# Patient Record
(demographics unavailable — no encounter records)

---

## 2018-01-26 NOTE — EMERGENCY ROOM REPORT
History of Present Illness


General


Chief Complaint:  Pregnancy Complications


Source:  Patient, Medical Record





Present Illness


HPI


22-year-old female patient presents to ER complaining of right lower quadrant 

pain since this morning.


Patient states the pain is intermittent; reports the pain is dull and "squeezing

" in nature.


Patient states she has not taken anything for the pain; reports she is able to 

"breathe through the pain" until it subsides.


Patient states she is 5 weeks pregnant.


Patient states she is currently taking prenatal vitamins.


Patient reports a history of 2 previous pregnancies; states both resulted in 

spontaneous abortions. Patient reports both pregnancies were considered high 

risk secondary to high blood pressure.


Patient reports history of allergy to Acetaminophen; states she becomes 

nauseous and vomits when she takes the pills.


Patient denies fever, dysuria, hematuria, vaginal bleeding.


Allergies:  


Coded Allergies:  


     ACETAMINOPHEN (Verified  Allergy, Unknown, 10/26/15)


     AMOXICILLIN (Verified  Allergy, Unknown, 18)


     IBUPROFEN (Verified  Allergy, Unknown, 10/26/15)


     PAMABROM (Verified  Allergy, Unknown, 10/26/15)





Patient History


Last Menstrual Period:  17


Pregnant Now:  Yes - 5 weeks


:  3


Para:  0


Reviewed Nursing Documentation:  PMH: Agreed, PSxH: Agreed





Nursing Documentation-PMH


Past Medical History:  No History, Except For


Hx Asthma:  Yes - positive TB test


Hx Neurological Problems:  Yes - LUPUS





Review of Systems


All Other Systems:  negative except mentioned in HPI





Physical Exam





Vital Signs








  Date Time  Temp Pulse Resp B/P (MAP) Pulse Ox O2 Delivery O2 Flow Rate FiO2


 


18 16:53 98.2 88 18 127/72 100 Room Air  








Sp02 EP Interpretation:  reviewed, normal


General Appearance:  no apparent distress, alert, GCS 15, non-toxic


Head:  normocephalic, atraumatic


Eyes:  bilateral eye normal inspection, bilateral eye PERRL


ENT:  hearing grossly normal, normal pharynx, normal voice, TMs + canals normal

, uvula midline, moist mucus membranes


Neck:  full range of motion


Respiratory:  chest non-tender, lungs clear, normal breath sounds, no 

respiratory distress, speaking full sentences


Cardiovascular #1:  regular rate, rhythm, no edema


Gastrointestinal:  normal bowel sounds, soft, no mass, non-distended, no 

guarding, no rebound, tenderness - RLQ, other - pain in RLQ with jumping on 

right foot


Genitourinary:  no CVA tenderness


Musculoskeletal:  back normal, gait/station normal, normal range of motion, non-

tender, no calf tenderness


Neurologic:  alert, oriented x3, responsive, motor strength/tone normal, 

sensory intact, speech normal


Psychiatric:  mood/affect normal


Skin:  normal color, no rash, warm/dry, well hydrated





Medical Decision Making


PA Attestation


Dr. Martins  is my supervising Physician whom patient management has been 

discussed with.


Diagnostic Impression:  


 Primary Impression:  


 Abdominal pain during pregnancy


 Additional Impression:  


 Cyst of left kidney


ER Course


Pt. presents to the ED c/o abdominal pain.





Ddx considered but are not limited to UTI, appendicitis, ovarian torsion, 

pregnancy related abdominal pain.


US performed in ED by Dr. Martins to confirm presence of IUP. 


Discussed workup with Dr. Martins, began workup to rule out appendicitis and 

ovarian torsion.





Vital signs: are WNL, pt. is afebrile.





ORDERS: 


UA with reflux, urine pregnancy


CBC, CMP, PT, PTT, lipase


Type and Cross


Abdominal US





US technician reports no acute findings of appendicitis or ovarian torsion. 

Reports finding of cyst in left kidney.





ER COURSE:


Discussed results of labs and imaging with patient.


CBC shows no elevation in WBC, infection unlikely.


UA negative for nitrites and WBCs, UTI unlikely. 


Abdominal US negative for acute appendicitis and ovarian torsion. Informed 

patient of left kidney cyst; informed patient that cyst is unlikely to be cause 

of pain; instructed patient to follow up with PCP regarding monitoring and 

treatment of cyst.








Patient reporting pain has subsided since acute onset. Patient resting 

comfortably in the room, in no acute distress, non-toxic appearing.


Patient requested medication for nausea. Explained possible effects of nausea 

medications on pregnancy. Patient declined use of medication for nausea 

following discussion of risks of use.


Patient was not discharge home with medications for pain secondary to allergy 

symptoms and pregnancy. Patient declined use of medication for pain.


Patient states she does not want to take any medication that may cause 

potential complications with her pregnancy.





Informed patient that abdominal pain may be related to pregnancy. Patient 

instructed to follow up with OBGYN in 3-5 days for further treatment and 

evaluation of pain symptoms. Patient reports understanding and agreement with 

treatment plan.





At this time pt. is stable for d/c to home with strict instructions to return 

to ER for new and/or worsening of symptoms.


Will provide printed patient care instructions, and any necessary 

prescriptions. 


Care plan and follow up instructions have been discussed with the patient prior 

to discharge





Labs








Test


  18


17:15 18


18:20


 


Urine Color Pale yellow  


 


Urine Appearance Clear  


 


Urine pH 7 (4.5-8.0)  


 


Urine Specific Gravity


  1.010


(1.005-1.035) 


 


 


Urine Protein


  Negative


(NEGATIVE) 


 


 


Urine Glucose (UA)


  Negative


(NEGATIVE) 


 


 


Urine Ketones


  Negative


(NEGATIVE) 


 


 


Urine Occult Blood


  Negative


(NEGATIVE) 


 


 


Urine Nitrite


  Negative


(NEGATIVE) 


 


 


Urine Bilirubin


  Negative


(NEGATIVE) 


 


 


Urine Urobilinogen


  Normal MG/DL


(0.0-1.0) 


 


 


Urine Leukocyte Esterase 1+ (NEGATIVE)  


 


Urine RBC


  0-2 /HPF (0 -


2) 


 


 


Urine WBC


  0-2 /HPF (0 -


2) 


 


 


Urine Squamous Epithelial


Cells Moderate /LPF


(NONE/OCC) 


 


 


Urine Bacteria


  Occasional


/HPF (NONE) 


 


 


Urine HCG, Qualitative Positive  


 


White Blood Count


  


  10.6 K/UL


(4.8-10.8)


 


Red Blood Count


  


  4.10 M/UL


(4.20-5.40)


 


Hemoglobin


  


  12.2 G/DL


(12.0-16.0)


 


Hematocrit


  


  36.5 %


(37.0-47.0)


 


Mean Corpuscular Volume  89 FL (80-99) 


 


Mean Corpuscular Hemoglobin


  


  29.7 PG


(27.0-31.0)


 


Mean Corpuscular Hemoglobin


Concent 


  33.3 G/DL


(32.0-36.0)


 


Red Cell Distribution Width


  


  11.9 %


(11.6-14.8)


 


Platelet Count


  


  320 K/UL


(150-450)


 


Mean Platelet Volume


  


  6.7 FL


(6.5-10.1)


 


Neutrophils (%) (Auto)


  


  59.7 %


(45.0-75.0)


 


Lymphocytes (%) (Auto)


  


  30.1 %


(20.0-45.0)


 


Monocytes (%) (Auto)


  


  6.6 %


(1.0-10.0)


 


Eosinophils (%) (Auto)


  


  2.7 %


(0.0-3.0)


 


Basophils (%) (Auto)


  


  1.0 %


(0.0-2.0)


 


Prothrombin Time


  


  10.1 SEC


(9.30-11.50)


 


Prothromb Time International


Ratio 


  1.0 (0.9-1.1) 


 


 


Activated Partial


Thromboplast Time 


  29 SEC (23-33) 


 


 


Sodium Level


  


  138 MMOL/L


(136-145)


 


Potassium Level


  


  3.9 MMOL/L


(3.5-5.1)


 


Chloride Level


  


  105 MMOL/L


()


 


Carbon Dioxide Level


  


  26 MMOL/L


(21-32)


 


Anion Gap


  


  7 mmol/L


(5-15)


 


Blood Urea Nitrogen


  


  10 mg/dL


(7-18)


 


Creatinine


  


  0.8 MG/DL


(0.55-1.30)


 


Estimat Glomerular Filtration


Rate 


  > 60 mL/min


(>60)


 


Glucose Level


  


  84 MG/DL


()


 


Calcium Level


  


  8.6 MG/DL


(8.5-10.1)


 


Total Bilirubin


  


  0.1 MG/DL


(0.2-1.0)


 


Aspartate Amino Transf


(AST/SGOT) 


  11 U/L (15-37) 


 


 


Alanine Aminotransferase


(ALT/SGPT) 


  15 U/L (12-78) 


 


 


Alkaline Phosphatase


  


  70 U/L


()


 


Total Protein


  


  7.3 G/DL


(6.4-8.2)


 


Albumin


  


  3.4 G/DL


(3.4-5.0)


 


Globulin  3.9 g/dL 


 


Albumin/Globulin Ratio  0.9 (1.0-2.7) 


 


Lipase


  


  158 U/L


()


 


Human Chorionic Gonadotropin,


Quant 


  66764 mIU/mL


(1-6)








CT/MRI/US Diagnostic Results


CT/MRI/US Diagnostic Results :  


   Imaging Test Ordered:  US abdomen


   Impression


No evidence acute intra-abdominal pathology.


 


Left simple renal cyst.





Last Vital Signs








  Date Time  Temp Pulse Resp B/P (MAP) Pulse Ox O2 Delivery O2 Flow Rate FiO2


 


18 16:53 98.2 88 18 127/72 100 Room Air  








Disposition:  HOME, SELF-CARE


Condition:  Stable


Patient Instructions:  Abdominal Pain During Pregnancy, Easy-to-Read





Additional Instructions:  


Followup with OBGYN in 3 -5 days.


Take medications as directed. 


Patient questions asked and answered.


ER precautions given, patient instructed to return to ER immediately for any 

new or worsening of symptoms.











Rodolfo Honeycutt 2018 17:15

## 2018-01-27 NOTE — DIAGNOSTIC IMAGING REPORT
Indication: Pain

 

Technique: US ABD Complete

 

Comparison: None

 

Findings: 

Imaged portions of the pancreatic head are unremarkable. The body and tail are not

seen. Liver is normal in size with the right lobe measuring 15.9 cm in length.

Hepatic contour is smooth. No focal liver lesion is appreciated sonographically.

Portal vein is patent with normal directional flow. Imaged hepatic veins are patent.

Common bile duct measures 3.4 mm in diameter. Gallbladder within normal limits. No

gallbladder wall thickening or pericholecystic fluid.

 

Kidneys are symmetric in size. They demonstrate normal parenchymal thickness and

echogenicity. A circumscribed anechoic rounded structure in the midpole the left

kidney measuring up to 1.4 cm compatible with a simple renal cyst. No hydronephrosis

or sonographically appreciable renal stones bilaterally.

 

Spleen unremarkable in appearance. It is normal in size.

 

Visualized portions of the aorta and IVC unremarkable in appearance. There is no

ascites.

 

IMPRESSION: 

No sonographic evidence of acute intra-abdominal pathology.

 

Left simple renal cyst.

 

This corresponds with the preliminary report issued to the emergency department by

the ultrasound technologist.

## 2018-02-22 NOTE — EMERGENCY ROOM REPORT
History of Present Illness


General


Chief Complaint:  Pregnancy Complications


Source:  Patient


 (Rodolfo Honeycutt)





Present Illness


HPI


24 yo  patient presents to ER sent by OBGYN for possible threatened 

.


Patient reports vaginal spotting x3days; denies passage of tissue.


Patient denies vaginal discharge.


Denies dysuria, frequency, urgency.


Patient complaining of abdominal pain and flank pain. 


Patient also complains of nausea and vomiting during this time. 


Denies hx of trauma.


States unable to take nausea and NSAID medications due to risk of fetal 

complications and allergies to medications.


Patient reports following pelvic exam at OBGYN was sent to ER to rule out 

threatened .


Patient reports history of 2 spontaneous abortions and one high risk pregnancy 

that went to full term.


Patient denies chest pain, HA, SOB, rash.


 (Rodolfo Honeycutt)


Allergies:  


Coded Allergies:  


     ACETAMINOPHEN (Verified  Allergy, Unknown, 10/26/15)


     AMOXICILLIN (Verified  Allergy, Unknown, 18)


     IBUPROFEN (Verified  Allergy, Unknown, 10/26/15)


     PAMABROM (Verified  Allergy, Unknown, 10/26/15)





Patient History


Past Medical History:  see triage record


Last Menstrual Period:  2017


Pregnant Now:  Yes


:  4


Para:  1


 (Rodolfo Honeycutt)





Nursing Documentation-[facility]


Past Medical History:  No History, Except For


Hx Asthma:  Yes - positive TB test


Hx Neurological Problems:  Yes - LUPUS


 (Rodolfo Honeycutt)





Review of Systems


All Other Systems:  negative except mentioned in HPI


 (Rodolfo Honeycutt)





Physical Exam





Vital Signs








  Date Time  Temp Pulse Resp B/P (MAP) Pulse Ox O2 Delivery O2 Flow Rate FiO2


 


18 13:37 98.3 106 18 109/69 97 Room Air  





 98.2       








Sp02 EP Interpretation:  reviewed, normal


General Appearance:  well appearing, alert, GCS 15, non-toxic, mild distress


Head:  normocephalic, atraumatic


Eyes:  bilateral eye normal inspection, bilateral eye PERRL


ENT:  hearing grossly normal, normal pharynx, normal voice, TMs + canals normal

, uvula midline, moist mucus membranes


Neck:  full range of motion


Respiratory:  normal inspection, lungs clear, normal breath sounds, no 

respiratory distress, no accessory muscle use, no wheezing, speaking full 

sentences


Cardiovascular #1:  regular rate, rhythm, no edema


Gastrointestinal:  normal bowel sounds, non tender, soft, no mass, non-distended

, no guarding, no rebound, other - negative Rovsing, negative TTP at McBurney's 

point


Genitourinary:  no CVA tenderness, other, deferred


Musculoskeletal:  back normal, digits/nails normal, gait/station normal, normal 

range of motion, no calf tenderness


Neurologic:  alert, oriented x3, responsive, motor strength/tone normal, 

sensory intact, normal gait


Psychiatric:  mood/affect normal


Skin:  no rash


Lymphatic:  no adenopathy


 (Rodolfo Honeycutt)





Medical Decision Making


PA Attestation


Dr. Rosado is my supervising Physician whom patient management has been 

discussed with.


 (Rodolfo Honeycutt)


Diagnostic Impression:  


 Primary Impression:  


 Abdominal pain during pregnancy


 Qualified Codes:  O26.891 - Other specified pregnancy related conditions, 

first trimester; R10.9 - Unspecified abdominal pain


 Additional Impressions:  


 Nausea/vomiting in pregnancy


 Threatened miscarriage in early pregnancy


ER Course


Pt. presents to the ED c/o possible threatened  sent to ER by OBGYN.





Ddx considered but are not limited to threatened , ectopic pregnancy, 

missed , gestational trophoblastic disease.





Vital signs: WNL pt. is afebrile; pulse slightly elevated, likely normal due to 

patient being mildly anxious over possible pregnancy complications; denies 

chest pain, SOB.





Ordered labs, UA, HCQ quant, urine pregnancy, pelvic US, type and screen, and 

IV fluids.


Patient declining medications at this time; reports allergies to Tylenol and 

Motrin and does not want nausea medications due to possible concerns of fetal 

complications.





ER COURSE:


Patient deferred pelvic exam; states already had pelvic exam performed at OBGYN 

today and does not want another performed.


CBC unremarkable, no elevation in WBC.


HCG quant elevated; shows increase in level from previous visit.


UA negative for nitrites, does not require treatment for UTI


Urine HCG positive.


Pelvic US negative; shows IUP, fetal pole, yolk and gestational sac, FHR 

positive and no free fluid or adnexal masses.


Results discussed with patient. Informed patient of type and screen results.





Patient resting comfortably in bed, reports feeling mildly better after 

receiving IV fluids. Patient still reports pain complaints at this time.


Informed patient to followup with OBGYN and primary care provider for further 

followup and treatment. Patient reports appointments already scheduled.


Informed patient that she should discuss bed rest with OBGYN due to recurrence 

of symptoms. Informed patient to rest at home until followup visit.


Patient and mother requesting copy of ultrasound report from today and previous 

visits. Patient informed to report to medical records. Provided copy of today's 

US results in discharge paperwork





DISCHARGE: 


No rx provided at this time. Followup with OBGYN for further treatment for 

symptom relief.





At this time pt. is stable for d/c to home. Patient vitals WNL prior to 

discharge.


Will provide printed patient care instructions, and any necessary prescriptions.


Patient instructed to follow with primary care provider for further treatment 

and referral as needed.


Care plan and follow up instructions have been discussed with the patient prior 

to discharge.


Patient reports understanding and agreement to treatment plan.


Patient questions asked and answered.


ER precautions given, patient instructed to return to ER immediately for any 

new or worsening of symptoms including but not limited to heavy bleeding, fever

, pain.





Labs








Test


  18


14:00 18


14:10


 


White Blood Count


  7.5 K/UL


(4.8-10.8) 


 


 


Red Blood Count


  4.59 M/UL


(4.20-5.40) 


 


 


Hemoglobin


  13.7 G/DL


(12.0-16.0) 


 


 


Hematocrit


  40.5 %


(37.0-47.0) 


 


 


Mean Corpuscular Volume 88 FL (80-99)  


 


Mean Corpuscular Hemoglobin


  29.9 PG


(27.0-31.0) 


 


 


Mean Corpuscular Hemoglobin


Concent 33.9 G/DL


(32.0-36.0) 


 


 


Red Cell Distribution Width


  11.9 %


(11.6-14.8) 


 


 


Platelet Count


  298 K/UL


(150-450) 


 


 


Mean Platelet Volume


  6.7 FL


(6.5-10.1) 


 


 


Neutrophils (%) (Auto)


  79.1 %


(45.0-75.0) 


 


 


Lymphocytes (%) (Auto)


  12.7 %


(20.0-45.0) 


 


 


Monocytes (%) (Auto)


  6.2 %


(1.0-10.0) 


 


 


Eosinophils (%) (Auto)


  1.4 %


(0.0-3.0) 


 


 


Basophils (%) (Auto)


  0.7 %


(0.0-2.0) 


 


 


Sodium Level


  135 MMOL/L


(136-145) 


 


 


Potassium Level


  4.0 MMOL/L


(3.5-5.1) 


 


 


Chloride Level


  101 MMOL/L


() 


 


 


Carbon Dioxide Level


  27 MMOL/L


(21-32) 


 


 


Anion Gap


  7 mmol/L


(5-15) 


 


 


Blood Urea Nitrogen 9 mg/dL (7-18)  


 


Creatinine


  0.6 MG/DL


(0.55-1.30) 


 


 


Estimat Glomerular Filtration


Rate > 60 mL/min


(>60) 


 


 


Glucose Level


  95 MG/DL


() 


 


 


Calcium Level


  9.1 MG/DL


(8.5-10.1) 


 


 


Total Bilirubin


  0.1 MG/DL


(0.2-1.0) 


 


 


Aspartate Amino Transf


(AST/SGOT) 19 U/L (15-37) 


  


 


 


Alanine Aminotransferase


(ALT/SGPT) 21 U/L (12-78) 


  


 


 


Alkaline Phosphatase


  71 U/L


() 


 


 


Total Protein


  7.4 G/DL


(6.4-8.2) 


 


 


Albumin


  3.3 G/DL


(3.4-5.0) 


 


 


Globulin 4.1 g/dL  


 


Albumin/Globulin Ratio 0.8 (1.0-2.7)  


 


Lipase


  140 U/L


() 


 


 


Human Chorionic Gonadotropin,


Quant [id number] mIU/mL


(1-6) 


 


 


Urine Color  Yellow 


 


Urine Appearance  Clear 


 


Urine pH  7 (4.5-8.0) 


 


Urine Specific Gravity


  


  1.015


(1.005-1.035)


 


Urine Protein


  


  Negative


(NEGATIVE)


 


Urine Glucose (UA)


  


  Negative


(NEGATIVE)


 


Urine Ketones


  


  Negative


(NEGATIVE)


 


Urine Occult Blood  2+ (NEGATIVE) 


 


Urine Nitrite


  


  Negative


(NEGATIVE)


 


Urine Bilirubin


  


  Negative


(NEGATIVE)


 


Urine Urobilinogen


  


  Normal MG/DL


(0.0-1.0)


 


Urine Leukocyte Esterase  1+ (NEGATIVE) 


 


Urine RBC


  


  2-4 /HPF (0 -


2)


 


Urine WBC


  


  2-4 /HPF (0 -


2)


 


Urine Squamous Epithelial


Cells 


  Moderate /LPF


(NONE/OCC)


 


Urine Bacteria


  


  Few /HPF


(NONE)


 


Urine HCG, Qualitative  Positive 








 (Rodolfo Honeycutt P.A.)





CT/MRI/US Diagnostic Results


CT/MRI/US Diagnostic Results :  


   Imaging Test Ordered:  Pelvis US


   Impression


Appear to be GS, yolk sac, and fetal pole measured 11 wks and 2 days.


FHR positive at 168bpm.


No adnexal mass.


No free fluid.


 (Rodolfo Honeycutt P.A.)





Last Vital Signs








  Date Time  Temp Pulse Resp B/P (MAP) Pulse Ox O2 Delivery O2 Flow Rate FiO2


 


18 13:37 98.3 106 18 109/69 97 Room Air  





 98.2       








 (Rodolfo Honeycutt)





Last Vital Signs








  Date Time  Temp Pulse Resp B/P (MAP) Pulse Ox O2 Delivery O2 Flow Rate FiO2


 


18 16:23 98.3 68 18 109/69 97 Room Air  





 98.2       








Status:  improved


 (Joey Rosado M.D.)


Disposition:  HOME, SELF-CARE


Condition:  Stable


Patient Instructions:  Abdominal Pain During Pregnancy, Easy-to-Read





Additional Instructions:  


Followup with OBGYN in 2-3 days.


Patient questions asked and answered.


ER precautions given, patient instructed to return to ER immediately for any 

new or worsening of symptoms.





Ultrasound results below.


Appear to be GS, yolk sac, and fetal pole measured 11 wks and 2 days.


FHR positive at 168bpm.


No adnexal mass.


No free fluid.











Rodolfo Honeycutt 2018 14:33


Joey Rosado M.D. 2018 23:18

## 2018-05-04 NOTE — DIAGNOSTIC IMAGING REPORT
Indications: Pregnant patient. Abdominal CABG x1 week. Nausea, vomiting. 

 

Technique: Transabdominal real-time grayscale and duplex Doppler imaging of

intrauterine pregnancy was performed.

 

Comparison: 2/22/2018.

 

Findings:

There is a single live intrauterine pregnancy identified. Presentation is breech at

this time. A three-vessel cord is noted. The placenta is anterior. Spontaneous fetal

motion and cardiac motion noted with a fetal heart rate of approximately 140 bpm.

Amniotic fluid index is within normal limits, measuring approximately 15 mm.

Biparietal diameter of approximately 4.8 cm. Head circumference of approximately 18

cm. Abdominal circumference approximately 15 cm. Femur length approximately 3.3 cm.

No gross fetal abnormality is identified however please note full anatomy scan with

OB/GYN recommended for better evaluation. The cervix is long and closed. Ovaries are

not identified.

 

IMPRESSION: 

[place] live intrauterine pregnancy with composite gestational age by ultrasound of

approximately 20 weeks 3 days. No definite focal abnormality identified, however

anatomy scan is limited.

 

Note: A negative ultrasound evaluation does not insure fetal well-being or positive

outcome for the pregnancy. Fetal monitoring including a nonstress test may be needed

and clinical evaluation by OB/GYN is highly recommended.

## 2018-05-04 NOTE — EMERGENCY ROOM REPORT
History of Present Illness


General


Chief Complaint:  Abdominal Pain


Source:  Patient





Present Illness


HPI


24 yo female pregnant patient presents to ER complaining of abdominal pain and 

vaginal cramping for the past few days.


Patient reports that she is 20 weeks pregnant. reports her "friend" performed 

an ultrasound yesterday and saw that there was less fluid than there should be, 

told, "friend" to go to OB/GYN. Reports she went OB/GYN today.


Reports was seen in OB/GYN office today and sent to ER because could not 

perform ultrasound in office after that time.


States unable to take nausea and NSAID medications due to risk of fetal 

complications and allergies to medications.


Patient reports history of 2 spontaneous abortions and one high risk pregnancy 

that went to full term; reports high risk pregnancy due to leg swelling and 

blood pressure. Reports hx of intermittent leg swelling during this time. 

Denies pain with ambulation or calf pain acutely.


Denies hx of blood pressure problems or past medical hx.


Denies hx of fainting.


Denies fever, chest pain, calf pain, HA, vision changes.


patient also complains of intermittent epistaxis during this time, reports 

bleeding from  nose.  Terrell active bleeding at this time.


Allergies:  


Coded Allergies:  


     AMOXICILLIN (Verified  Allergy, Unknown, 18)





Patient History


Past Medical History:  see triage record


Reviewed Nursing Documentation:  PMH: Agreed; PSxH: Agreed





Nursing Documentation-PMH


Past Medical History:  No History, Except For


Hx Asthma:  Yes - positive TB test


Hx Neurological Problems:  Yes - LUPUS





Review of Systems


All Other Systems:  negative except mentioned in HPI





Physical Exam





Vital Signs








  Date Time  Temp Pulse Resp B/P (MAP) Pulse Ox O2 Delivery O2 Flow Rate FiO2


 


18 12:05 99.2 110 20 119/63 97 Room Air  





 99.1       








Sp02 EP Interpretation:  reviewed, normal


General Appearance:  well appearing, no apparent distress, alert, GCS 15, non-

toxic


Head:  normocephalic, atraumatic


Eyes:  bilateral eye normal inspection, bilateral eye PERRL


ENT:  hearing grossly normal, normal pharynx, no angioedema, normal voice, TMs 

+ canals normal, uvula midline, moist mucus membranes, other - left nares: 

dried blood, no active bleeding


Neck:  full range of motion


Respiratory:  lungs clear, normal breath sounds, no rhonchi, no respiratory 

distress, no accessory muscle use, no wheezing, speaking full sentences


Cardiovascular #1:  regular rate, rhythm, no edema


Gastrointestinal:  non tender, soft, no mass, non-distended, no guarding, no 

rebound


Genitourinary:  no CVA tenderness, deferred


Musculoskeletal:  back normal, digits/nails normal, gait/station normal, normal 

range of motion, non-tender, no calf tenderness, Eliseo's Sign negative


Neurologic:  alert, oriented x3, responsive, motor strength/tone normal, 

sensory intact


Psychiatric:  mood/affect normal





Medical Decision Making


PA Attestation


Dr. Hills  is my supervising Physician whom patient management has been 

discussed with.


Diagnostic Impression:  


 Primary Impression:  


 Abdominal pain during pregnancy


ER Course


Pt presents to ED c/o vaginal bleeding/spotting and epistaxis.





DDX considered but are not limited to threatened , incomplete , 

complete , UTI, placenta previa, placenta abruption.





VITAL SIGNS are WNL, patient is afebrile.





Ordered CBC, CMP, UA, UCG, bHCG, IV NS and pelvic US. Tylenol for pain control.





ER COURSE:


Patient declined medication.





CBC and CMP unremarkable, Hgb 11.1, blood glucose 116


UA results moderate epithelial cells, leukocyte esterase 2+, negative nitrites, 

no complaints of dysuria, will not treat for UTI at this time.


Urine pregnancy positive


BetaHCG 8514





Blood type previously done.


Blood type O positive, antibody screen negative.





Pelvic US shows 20 week 3 day gestation, IUP,  , active fetal movements.





Patient reports feeling better following IV fluids.





Discuss patient with Dr. Hills. Due to concerns of premature labor in patient 

over 20 weeks.


Contacted L&D at [facility], discuss patient with OBGYN Dr. Polanco. 


Dr. Polanco states will accept transfer of patient.


Patient transferred by ambulance to [facility] L[facility].








- Please note that this Emergency Department Report was dictated using [employer] technology software, occasionally this can lead to 

erroneous entry secondary to interpretation by the dictation equipment.





Labs








Test


  18


12:30 18


12:45


 


Urine Color Pale yellow  


 


Urine Appearance Clear  


 


Urine pH 6.5 (4.5-8.0)  


 


Urine Specific Gravity


  1.010


(1.005-1.035) 


 


 


Urine Protein


  Negative


(NEGATIVE) 


 


 


Urine Glucose (UA)


  Negative


(NEGATIVE) 


 


 


Urine Ketones


  Negative


(NEGATIVE) 


 


 


Urine Occult Blood


  Negative


(NEGATIVE) 


 


 


Urine Nitrite


  Negative


(NEGATIVE) 


 


 


Urine Bilirubin


  Negative


(NEGATIVE) 


 


 


Urine Urobilinogen


  Normal MG/DL


(0.0-1.0) 


 


 


Urine Leukocyte Esterase 2+ (NEGATIVE)  


 


Urine RBC


  0-2 /HPF (0 -


2) 


 


 


Urine WBC


  2-4 /HPF (0 -


2) 


 


 


Urine Squamous Epithelial


Cells Moderate /LPF


(NONE/OCC) 


 


 


Urine Bacteria


  Few /HPF


(NONE) 


 


 


Urine HCG, Qualitative


  Positive


(NEGATIVE) 


 


 


White Blood Count


  


  10.7 K/UL


(4.8-10.8)


 


Red Blood Count


  


  3.91 M/UL


(4.20-5.40)


 


Hemoglobin


  


  11.8 G/DL


(12.0-16.0)


 


Hematocrit


  


  34.5 %


(37.0-47.0)


 


Mean Corpuscular Volume  88 FL (80-99) 


 


Mean Corpuscular Hemoglobin


  


  30.1 PG


(27.0-31.0)


 


Mean Corpuscular Hemoglobin


Concent 


  34.1 G/DL


(32.0-36.0)


 


Red Cell Distribution Width


  


  11.9 %


(11.6-14.8)


 


Platelet Count


  


  299 K/UL


(150-450)


 


Mean Platelet Volume


  


  6.8 FL


(6.5-10.1)


 


Neutrophils (%) (Auto)


  


  75.0 %


(45.0-75.0)


 


Lymphocytes (%) (Auto)


  


  17.6 %


(20.0-45.0)


 


Monocytes (%) (Auto)


  


  4.9 %


(1.0-10.0)


 


Eosinophils (%) (Auto)


  


  1.8 %


(0.0-3.0)


 


Basophils (%) (Auto)


  


  0.7 %


(0.0-2.0)


 


Sodium Level


  


  136 MMOL/L


(136-145)


 


Potassium Level


  


  3.7 MMOL/L


(3.5-5.1)


 


Chloride Level


  


  103 MMOL/L


()


 


Carbon Dioxide Level


  


  21 MMOL/L


(21-32)


 


Anion Gap


  


  12 mmol/L


(5-15)


 


Blood Urea Nitrogen


  


  10 mg/dL


(7-18)


 


Creatinine


  


  0.5 MG/DL


(0.55-1.30)


 


Estimat Glomerular Filtration


Rate 


  > 60 mL/min


(>60)


 


Glucose Level


  


  111 MG/DL


()


 


Calcium Level


  


  8.8 MG/DL


(8.5-10.1)


 


Total Bilirubin


  


  0.2 MG/DL


(0.2-1.0)


 


Aspartate Amino Transf


(AST/SGOT) 


  14 U/L (15-37) 


 


 


Alanine Aminotransferase


(ALT/SGPT) 


  16 U/L (12-78) 


 


 


Alkaline Phosphatase


  


  79 U/L


()


 


Total Protein


  


  6.7 G/DL


(6.4-8.2)


 


Albumin


  


  2.8 G/DL


(3.4-5.0)


 


Globulin  3.9 g/dL 


 


Albumin/Globulin Ratio  0.7 (1.0-2.7) 


 


Lipase


  


  130 U/L


()


 


Human Chorionic Gonadotropin,


Quant 


  8514 mIU/mL


(1-6)








CT/MRI/US Diagnostic Results


CT/MRI/US Diagnostic Results :  


   Imaging Test Ordered:  Pelvic US


   Impression


per ultrasound technician, fetal heart rate 140, gestational age 20 weeks 3 days

, normal fluid, active fetal movements.





Last Vital Signs








  Date Time  Temp Pulse Resp B/P (MAP) Pulse Ox O2 Delivery O2 Flow Rate FiO2


 


18 12:05 99.2 110 20 119/63 97 Room Air  





 99.1       








Disposition:  [facility]


Condition:  Serious


Patient Instructions:  Abdominal Pain During Pregnancy











Rodolfo Honeycutt May 4, 2018 12:17